# Patient Record
Sex: MALE | Race: WHITE | NOT HISPANIC OR LATINO | Employment: FULL TIME | URBAN - METROPOLITAN AREA
[De-identification: names, ages, dates, MRNs, and addresses within clinical notes are randomized per-mention and may not be internally consistent; named-entity substitution may affect disease eponyms.]

---

## 2018-04-25 ENCOUNTER — OFFICE VISIT (OUTPATIENT)
Dept: FAMILY MEDICINE CLINIC | Facility: CLINIC | Age: 33
End: 2018-04-25

## 2018-04-25 VITALS
HEIGHT: 72 IN | HEART RATE: 88 BPM | RESPIRATION RATE: 16 BRPM | TEMPERATURE: 97.4 F | SYSTOLIC BLOOD PRESSURE: 113 MMHG | WEIGHT: 188 LBS | DIASTOLIC BLOOD PRESSURE: 75 MMHG | BODY MASS INDEX: 25.47 KG/M2

## 2018-04-25 DIAGNOSIS — Z83.2 FAMILY HISTORY OF FACTOR V LEIDEN MUTATION: Primary | ICD-10-CM

## 2018-04-25 DIAGNOSIS — Z13.6 SCREENING FOR ISCHEMIC HEART DISEASE: ICD-10-CM

## 2018-04-25 PROCEDURE — 99395 PREV VISIT EST AGE 18-39: CPT | Performed by: FAMILY MEDICINE

## 2018-04-25 RX ORDER — ASPIRIN 325 MG
325 TABLET ORAL DAILY
COMMUNITY
End: 2018-07-12

## 2018-04-25 NOTE — PROGRESS NOTES
"Chief Complaint   Patient presents with   • Other     labs for family history of factor 5 deficiency       Subjective    He presents to the office to establish.  He is primarily here to get some lab work to rule out factor V Leiden deficiency.  His mother and sister have that condition.  He has never had a blood clot.  Recently he has been recovering from ankle fracture surgery sustained during working at his company.  He took several minutes and updated his chart.  He is due for some screening labs which have been ordered.  I have reviewed and updated his medications, medical history and problem list during today's office visit.     Patient Care Team:  Loi Saenz MD as PCP - General (Family Medicine)  Ramy Dawson DPM (Podiatry)    Social History   Substance Use Topics   • Smoking status: Never Smoker   • Smokeless tobacco: Never Used   • Alcohol use 1.2 oz/week     2 Standard drinks or equivalent per week       Review of Systems   Constitutional: Negative for fatigue.   Cardiovascular: Negative for chest pain.   Musculoskeletal: Positive for arthralgias.   Psychiatric/Behavioral: Decreased concentration: right leg aches some.   All other systems reviewed and are negative.      Objective     /75   Pulse 88   Temp 97.4 °F (36.3 °C) (Oral)   Resp 16   Ht 182.9 cm (72\")   Wt 85.3 kg (188 lb)   BMI 25.50 kg/m²     Body mass index is 25.5 kg/m².    Physical Exam   Constitutional: He is oriented to person, place, and time. He appears well-developed. No distress.   Eyes: Conjunctivae and lids are normal.   Neck: Carotid bruit is not present.   Cardiovascular: Normal rate, regular rhythm and normal heart sounds.    Pulmonary/Chest: Effort normal and breath sounds normal.   Musculoskeletal:   Walking boot on right lower leg.    Neurological: He is alert and oriented to person, place, and time.   Skin: Skin is warm and dry.   Psychiatric: He has a normal mood and affect. His behavior is normal.   Vitals " reviewed.       Data Reviewed:             Assessment/Plan     Problem List Items Addressed This Visit        Other    Family history of factor V Leiden mutation - Primary    Relevant Orders    Factor 5 Leiden    If screen is negative, stop aspirin      Other Visit Diagnoses     Screening for ischemic heart disease        Relevant Orders    Comprehensive metabolic panel    Lipid Panel With LDL/HDL Ratio    CBC and Differential          Orders Placed This Encounter   Procedures   • Factor 5 Leiden     Standing Status:   Future     Number of Occurrences:   1     Standing Expiration Date:   4/25/2019   • Comprehensive metabolic panel   • Lipid Panel With LDL/HDL Ratio   • CBC and Differential     Order Specific Question:   Manual Differential     Answer:   No         Current Outpatient Prescriptions:   •  aspirin 325 MG tablet, Take 325 mg by mouth Daily., Disp: , Rfl:     Return if symptoms worsen or fail to improve.

## 2018-05-01 ENCOUNTER — OFFICE VISIT (OUTPATIENT)
Dept: FAMILY MEDICINE CLINIC | Facility: CLINIC | Age: 33
End: 2018-05-01

## 2018-05-01 VITALS
HEIGHT: 72 IN | RESPIRATION RATE: 18 BRPM | HEART RATE: 91 BPM | OXYGEN SATURATION: 98 % | BODY MASS INDEX: 25.33 KG/M2 | DIASTOLIC BLOOD PRESSURE: 71 MMHG | TEMPERATURE: 97.9 F | WEIGHT: 187 LBS | SYSTOLIC BLOOD PRESSURE: 122 MMHG

## 2018-05-01 DIAGNOSIS — M54.50 CHRONIC RIGHT-SIDED LOW BACK PAIN WITHOUT SCIATICA: Primary | ICD-10-CM

## 2018-05-01 DIAGNOSIS — G89.29 CHRONIC RIGHT-SIDED LOW BACK PAIN WITHOUT SCIATICA: Primary | ICD-10-CM

## 2018-05-01 PROCEDURE — 99213 OFFICE O/P EST LOW 20 MIN: CPT | Performed by: NURSE PRACTITIONER

## 2018-05-01 NOTE — PROGRESS NOTES
Subjective   Isabel Parrish is a 32 y.o. male.     History of Present Illness   Isabel Parrish 32 y.o. male presents for evaluation and treatment of  low back pain. The patient first noted symptoms 2 months ago. It was not related to recent heavy lifting.  The pain intensity is moderate , and is located right side , lumbar and sacral. The pain is described as sharp and occurs constantly. The symptoms are not progressive. Symptoms are exacerbated by standing up and twisting. Factors which relieve the pain include nothing helps. Other associated symptoms include radiates to buttock and accross low back. Previous history of symptoms: never.    Patient states back pain seems worse after he eats but better after he drinks water.  He denies pain that radiates to the groin.  Denies mid or upper back pain . Denies abdominal pain or changes to bowel or bladder function.  Has FH of cholecystitis and renal calculi but no personal hx. Pain is worse when sitting and slightly better whey lying down.   The following portions of the patient's history were reviewed and updated as appropriate: allergies, current medications, past family history, past medical history, past social history, past surgical history and problem list.    Review of Systems   Constitutional: Negative for chills and fever.   Cardiovascular: Negative for chest pain.   Gastrointestinal: Negative for abdominal distention, abdominal pain, anal bleeding, blood in stool, constipation, diarrhea, nausea, rectal pain and vomiting.   Genitourinary: Negative for decreased urine volume, difficulty urinating, discharge, dysuria, enuresis, flank pain, frequency, genital sores, hematuria, penile pain, penile swelling, scrotal swelling, testicular pain and urgency.   Musculoskeletal: Positive for back pain. Negative for gait problem.   Neurological: Negative for weakness, numbness and headaches.       Objective   Physical Exam   Constitutional: He is oriented to person, place, and  time. He appears well-developed and well-nourished.   Pulmonary/Chest: Effort normal.   Musculoskeletal:        Lumbar back: He exhibits tenderness and pain. He exhibits normal range of motion, no bony tenderness, no swelling, no edema, no deformity, no laceration, no spasm and normal pulse.   Reports pulling to right lower back with right straight leg raise greater than 45 degrees.   Tenderness to palpation of right lumbar back.    Neurological: He is oriented to person, place, and time.   Reflex Scores:       Patellar reflexes are 2+ on the right side and 2+ on the left side.  Skin: Skin is warm and dry.   Psychiatric: He has a normal mood and affect. His behavior is normal. Judgment and thought content normal.   Nursing note and vitals reviewed.      Assessment/Plan   Isabel was seen today for back pain.    Diagnoses and all orders for this visit:    Chronic right-sided low back pain without sciatica  -     diclofenac (VOLTAREN) 1 % gel gel; Apply 4 g topically 4 (Four) Times a Day As Needed (low back pain).             Discussed with patient that location of pain and absence of other symptoms did not support cholecystitis or renal calculi.  Will treat with topical anti-inflammatory as patient is on  mg daily.   Will order imaging if no improvement in back pain or worsening of symptoms.

## 2018-05-02 DIAGNOSIS — R14.0 BLOATING SYMPTOM: Primary | ICD-10-CM

## 2018-05-02 DIAGNOSIS — R52 PAIN: ICD-10-CM

## 2018-05-02 LAB
ALBUMIN SERPL-MCNC: 4.8 G/DL (ref 3.5–5.2)
ALBUMIN/GLOB SERPL: 1.8 G/DL
ALP SERPL-CCNC: 100 U/L (ref 39–117)
ALT SERPL-CCNC: 47 U/L (ref 1–41)
AST SERPL-CCNC: 20 U/L (ref 1–40)
BASOPHILS # BLD AUTO: 0.03 10*3/MM3 (ref 0–0.2)
BASOPHILS NFR BLD AUTO: 0.5 % (ref 0–1.5)
BILIRUB SERPL-MCNC: 0.4 MG/DL (ref 0.1–1.2)
BUN SERPL-MCNC: 12 MG/DL (ref 6–20)
BUN/CREAT SERPL: 11.9 (ref 7–25)
CALCIUM SERPL-MCNC: 10.2 MG/DL (ref 8.6–10.5)
CHLORIDE SERPL-SCNC: 103 MMOL/L (ref 98–107)
CHOLEST SERPL-MCNC: 154 MG/DL (ref 0–200)
CO2 SERPL-SCNC: 28 MMOL/L (ref 22–29)
CREAT SERPL-MCNC: 1.01 MG/DL (ref 0.76–1.27)
EOSINOPHIL # BLD AUTO: 0.17 10*3/MM3 (ref 0–0.7)
EOSINOPHIL NFR BLD AUTO: 3 % (ref 0.3–6.2)
ERYTHROCYTE [DISTWIDTH] IN BLOOD BY AUTOMATED COUNT: 13.5 % (ref 11.5–14.5)
F5 GENE MUT ANL BLD/T: ABNORMAL
GFR SERPLBLD CREATININE-BSD FMLA CKD-EPI: 102 ML/MIN/1.73
GFR SERPLBLD CREATININE-BSD FMLA CKD-EPI: 85 ML/MIN/1.73
GLOBULIN SER CALC-MCNC: 2.6 GM/DL
GLUCOSE SERPL-MCNC: 88 MG/DL (ref 65–99)
HCT VFR BLD AUTO: 45.6 % (ref 40.4–52.2)
HDLC SERPL-MCNC: 41 MG/DL (ref 40–60)
HGB BLD-MCNC: 15.3 G/DL (ref 13.7–17.6)
IMM GRANULOCYTES # BLD: 0.03 10*3/MM3 (ref 0–0.03)
IMM GRANULOCYTES NFR BLD: 0.5 % (ref 0–0.5)
LDLC SERPL CALC-MCNC: 91 MG/DL (ref 0–100)
LDLC/HDLC SERPL: 2.21 {RATIO}
LYMPHOCYTES # BLD AUTO: 1.97 10*3/MM3 (ref 0.9–4.8)
LYMPHOCYTES NFR BLD AUTO: 34.4 % (ref 19.6–45.3)
MCH RBC QN AUTO: 29.3 PG (ref 27–32.7)
MCHC RBC AUTO-ENTMCNC: 33.6 G/DL (ref 32.6–36.4)
MCV RBC AUTO: 87.4 FL (ref 79.8–96.2)
MONOCYTES # BLD AUTO: 0.34 10*3/MM3 (ref 0.2–1.2)
MONOCYTES NFR BLD AUTO: 5.9 % (ref 5–12)
NEUTROPHILS # BLD AUTO: 3.21 10*3/MM3 (ref 1.9–8.1)
NEUTROPHILS NFR BLD AUTO: 56.2 % (ref 42.7–76)
PLATELET # BLD AUTO: 246 10*3/MM3 (ref 140–500)
POTASSIUM SERPL-SCNC: 4.9 MMOL/L (ref 3.5–5.2)
PROT SERPL-MCNC: 7.4 G/DL (ref 6–8.5)
RBC # BLD AUTO: 5.22 10*6/MM3 (ref 4.6–6)
SODIUM SERPL-SCNC: 140 MMOL/L (ref 136–145)
TRIGL SERPL-MCNC: 111 MG/DL (ref 0–150)
VLDLC SERPL CALC-MCNC: 22.2 MG/DL (ref 5–40)
WBC # BLD AUTO: 5.72 10*3/MM3 (ref 4.5–10.7)

## 2018-05-03 ENCOUNTER — PATIENT MESSAGE (OUTPATIENT)
Dept: FAMILY MEDICINE CLINIC | Facility: CLINIC | Age: 33
End: 2018-05-03

## 2018-05-03 PROBLEM — D68.2 FACTOR V DEFICIENCY (HCC): Status: ACTIVE | Noted: 2018-04-25

## 2018-05-03 PROBLEM — D68.2 FACTOR V DEFICIENCY (HCC): Status: ACTIVE | Noted: 2018-04-27

## 2018-05-04 NOTE — TELEPHONE ENCOUNTER
Allyssa Isabel MA 5/3/2018 4:27 PM EDT        ----- Message -----  From: Isabel Parrish  Sent: 5/3/2018 3:28 PM  To: [facility] Clinical Pool  Subject: Test Results Question     I have a question about COMPREHENSIVE METABOLIC PANEL resulted on 5/2/18 at 2:12 PM.  I hadn't been taking any other medications for a few weeks prior to this test with exception of the lovenox injections. I did have 1 beer at 2 different instances within that time. Just wondered if galbladder issues could factor into that result?

## 2018-05-07 ENCOUNTER — HOSPITAL ENCOUNTER (OUTPATIENT)
Dept: ULTRASOUND IMAGING | Facility: HOSPITAL | Age: 33
Discharge: HOME | End: 2018-05-07
Admitting: NURSE PRACTITIONER

## 2018-05-07 ENCOUNTER — APPOINTMENT (OUTPATIENT)
Dept: ULTRASOUND IMAGING | Facility: HOSPITAL | Age: 33
End: 2018-05-07

## 2018-05-07 PROCEDURE — 76705 ECHO EXAM OF ABDOMEN: CPT

## 2018-06-07 ENCOUNTER — OFFICE VISIT (OUTPATIENT)
Dept: RETAIL CLINIC | Facility: CLINIC | Age: 33
End: 2018-06-07

## 2018-06-07 VITALS
OXYGEN SATURATION: 98 % | HEART RATE: 72 BPM | DIASTOLIC BLOOD PRESSURE: 82 MMHG | RESPIRATION RATE: 18 BRPM | SYSTOLIC BLOOD PRESSURE: 128 MMHG

## 2018-06-07 DIAGNOSIS — H61.23 BILATERAL IMPACTED CERUMEN: Primary | ICD-10-CM

## 2018-06-07 PROCEDURE — 99212 OFFICE O/P EST SF 10 MIN: CPT | Performed by: NURSE PRACTITIONER

## 2018-06-07 RX ORDER — FLUTICASONE PROPIONATE 50 MCG
2 SPRAY, SUSPENSION (ML) NASAL DAILY
COMMUNITY
End: 2019-04-15

## 2018-06-07 NOTE — PROGRESS NOTES
Subjective:     Isabel Parrish is a 32 y.o.     Patient presents with a cerumen impaction on the left. He has a history of cerumen impaction. It started bothering him a couple of days ago. He has not tried to clean them out himself because last time it just made it worse.          The following portions of the patient's history were reviewed and updated as appropriate: allergies, current medications, past family history, past medical history, past social history, past surgical history and problem list.      Review of Systems   HENT: Positive for ear pain (left one feels  plugged up).    Respiratory: Negative.    Cardiovascular: Negative.    Neurological: Negative for dizziness, light-headedness and headaches.         Objective:      Physical Exam   HENT:   Nose: Nose normal.   Bilateral cerumen impaction, irrigated with warm warm water, H202, curette utilized, TM intact's intact bilaterally post impaction   Cardiovascular: Normal rate, regular rhythm, S1 normal, S2 normal and normal heart sounds.    Pulmonary/Chest: Effort normal. Decreased breath sounds: mildly decreased.   Lymphadenopathy:     He has no cervical adenopathy.   Vitals reviewed.          Diagnoses and all orders for this visit:    Bilateral impacted cerumen

## 2018-06-07 NOTE — PATIENT INSTRUCTIONS
Earwax Buildup  Your ears make a substance called earwax. It may also be called cerumen. Sometimes, too much earwax builds up in your ear canal. This can cause ear pain and make it harder for you to hear.  CAUSES  This condition is caused by too much earwax production or buildup.  RISK FACTORS  The following factors may make you more likely to develop this condition:  · Cleaning your ears often with swabs.  · Having narrow ear canals.  · Having earwax that is overly thick or sticky.  · Having eczema.  · Being dehydrated.  SYMPTOMS  Symptoms of this condition include:  · Reduced hearing.  · Ear drainage.  · Ear pain.  · Ear itch.  · A feeling of fullness in the ear or feeling that the ear is plugged.  · Ringing in the ear.  · Coughing.  DIAGNOSIS  Your health care provider can diagnose this condition based on your symptoms and medical history. Your health care provider will also do an ear exam to look inside your ear with a scope (otoscope). You may also have a hearing test.  TREATMENT  Treatment for this condition includes:  · Over-the-counter or prescription ear drops to soften the earwax.  · Earwax removal by a health care provider. This may be done:  ¨ By flushing the ear with body-temperature water.  ¨ With a medical instrument that has a loop at the end (earwax curette).  ¨ With a suction device.  HOME CARE INSTRUCTIONS  · Take over-the-counter and prescription medicines only as told by your health care provider.  · Do not put any objects, including an ear swab, into your ear. You can clean the opening of your ear canal with a washcloth.  · Drink enough water to keep your urine clear or pale yellow.  · If you have frequent earwax buildup or you use hearing aids, consider seeing your health care provider every 6-12 months for routine preventive ear cleanings. Keep all follow-up visits as told by your health care provider.  SEEK MEDICAL CARE IF:  · You have ear pain.  · Your condition does not improve with  treatment.  · You have hearing loss.  · You have blood, pus, or other fluid coming from your ear.  This information is not intended to replace advice given to you by your health care provider. Make sure you discuss any questions you have with your health care provider.  Document Released: 01/25/2006 Document Revised: 04/10/2017 Document Reviewed: 08/03/2016  [employer] Interactive Patient Education © 2017 [employer]

## 2018-07-12 ENCOUNTER — OFFICE VISIT (OUTPATIENT)
Dept: FAMILY MEDICINE CLINIC | Facility: CLINIC | Age: 33
End: 2018-07-12

## 2018-07-12 VITALS
TEMPERATURE: 97.2 F | RESPIRATION RATE: 16 BRPM | DIASTOLIC BLOOD PRESSURE: 83 MMHG | BODY MASS INDEX: 25.06 KG/M2 | SYSTOLIC BLOOD PRESSURE: 130 MMHG | HEIGHT: 72 IN | HEART RATE: 89 BPM | WEIGHT: 185 LBS

## 2018-07-12 DIAGNOSIS — R10.31 RIGHT LOWER QUADRANT ABDOMINAL PAIN: Primary | ICD-10-CM

## 2018-07-12 DIAGNOSIS — R19.5 POSITIVE FECAL OCCULT BLOOD TEST: ICD-10-CM

## 2018-07-12 DIAGNOSIS — K21.9 GASTROESOPHAGEAL REFLUX DISEASE WITHOUT ESOPHAGITIS: ICD-10-CM

## 2018-07-12 LAB
ALBUMIN SERPL-MCNC: 4.9 G/DL (ref 3.5–5.2)
ALP SERPL-CCNC: 121 U/L (ref 39–117)
ALT SERPL-CCNC: 23 U/L (ref 1–41)
AST SERPL-CCNC: 15 U/L (ref 1–40)
BASOPHILS # BLD AUTO: 0.04 10*3/MM3 (ref 0–0.2)
BASOPHILS NFR BLD AUTO: 0.7 % (ref 0–1.5)
BILIRUB DIRECT SERPL-MCNC: <0.2 MG/DL (ref 0–0.3)
BILIRUB SERPL-MCNC: 0.3 MG/DL (ref 0.1–1.2)
BUN SERPL-MCNC: 17 MG/DL (ref 6–20)
CREAT SERPL-MCNC: 0.95 MG/DL (ref 0.76–1.27)
DEVELOPER EXPIRATION DATE: ABNORMAL
DEVELOPER LOT NUMBER: ABNORMAL
EOSINOPHIL # BLD AUTO: 0.15 10*3/MM3 (ref 0–0.7)
EOSINOPHIL NFR BLD AUTO: 2.6 % (ref 0.3–6.2)
ERYTHROCYTE [DISTWIDTH] IN BLOOD BY AUTOMATED COUNT: 13.2 % (ref 11.5–14.5)
EXPIRATION DATE: ABNORMAL
FECAL OCCULT BLOOD SCREEN, POC: POSITIVE
HCT VFR BLD AUTO: 47.2 % (ref 40.4–52.2)
HGB BLD-MCNC: 15.5 G/DL (ref 13.7–17.6)
IMM GRANULOCYTES # BLD: 0 10*3/MM3 (ref 0–0.03)
IMM GRANULOCYTES NFR BLD: 0 % (ref 0–0.5)
LYMPHOCYTES # BLD AUTO: 1.66 10*3/MM3 (ref 0.9–4.8)
LYMPHOCYTES NFR BLD AUTO: 29.1 % (ref 19.6–45.3)
Lab: ABNORMAL
MCH RBC QN AUTO: 28 PG (ref 27–32.7)
MCHC RBC AUTO-ENTMCNC: 32.8 G/DL (ref 32.6–36.4)
MCV RBC AUTO: 85.4 FL (ref 79.8–96.2)
MONOCYTES # BLD AUTO: 0.49 10*3/MM3 (ref 0.2–1.2)
MONOCYTES NFR BLD AUTO: 8.6 % (ref 5–12)
NEGATIVE CONTROL: NEGATIVE
NEUTROPHILS # BLD AUTO: 3.37 10*3/MM3 (ref 1.9–8.1)
NEUTROPHILS NFR BLD AUTO: 59 % (ref 42.7–76)
PLATELET # BLD AUTO: 231 10*3/MM3 (ref 140–500)
POSITIVE CONTROL: POSITIVE
PROT SERPL-MCNC: 7.6 G/DL (ref 6–8.5)
RBC # BLD AUTO: 5.53 10*6/MM3 (ref 4.6–6)
WBC # BLD AUTO: 5.71 10*3/MM3 (ref 4.5–10.7)

## 2018-07-12 PROCEDURE — 82274 ASSAY TEST FOR BLOOD FECAL: CPT | Performed by: FAMILY MEDICINE

## 2018-07-12 PROCEDURE — 99215 OFFICE O/P EST HI 40 MIN: CPT | Performed by: FAMILY MEDICINE

## 2018-07-12 RX ORDER — OMEPRAZOLE 20 MG/1
20 CAPSULE, DELAYED RELEASE ORAL DAILY
COMMUNITY
End: 2019-04-15

## 2018-07-12 NOTE — PROGRESS NOTES
"Chief Complaint   Patient presents with   • Abdominal Pain   • Rectal Bleeding       Subjective   This patient presents the office with a several week history of mild to moderate right lower abdomen pain.  He has had some relief by taking over-the-counter omeprazole for 14 days and then his symptoms returned after stopping that medicine and he is now back on the medicine.  Tuesday he developed some bright red blood after a bowel movement.  The blood was not mixed in with the stool.  There was no pain associated with the rectal bleeding.  He has had normal bowel movements since then.  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  The patient was at physical therapy on Monday prior to the rectal bleeding and had taken ibuprofen on Monday.  Pain in the abdomen preceded him taking nonsteroidal anti-inflammatories.  There is family history of GERD.  There is no family history of colon cancer or colon polyps.  See patient supplied history below  I have reviewed and updated his medications, medical history and problem list during today's office visit.     Patient Care Team:  Loi Saenz MD as PCP - General (Family Medicine)  Ramy Dawson DPM (Podiatry)    Social History   Substance Use Topics   • Smoking status: Never Smoker   • Smokeless tobacco: Never Used   • Alcohol use 1.2 oz/week     2 Standard drinks or equivalent per week       Review of Systems   Constitutional: Negative for fever and unexpected weight loss.   Gastrointestinal: Positive for abdominal pain and nausea. Negative for constipation, diarrhea and vomiting.   Genitourinary: Negative for dysuria, frequency and hematuria.   Musculoskeletal: Negative for arthralgias and myalgias.   All other systems reviewed and are negative.      Objective     /83   Pulse 89   Temp 97.2 °F (36.2 °C) (Oral)   Resp 16   Ht 182.9 cm (72\")   Wt 83.9 kg (185 lb)   BMI 25.09 kg/m²     Body mass index is 25.09 kg/m².    Physical Exam   Constitutional: He appears " well-developed. No distress.   Abdominal: Soft. Normal appearance and bowel sounds are normal. There is no hepatosplenomegaly. There is tenderness in the right lower quadrant. There is rebound (refers pain to RLQ). There is no rigidity and no guarding.            Data Reviewed:     ifobt trace positive        Assessment/Plan     Problem List Items Addressed This Visit     Gastroesophageal reflux disease without esophagitis     Continue omeprazole         Relevant Medications    omeprazole (priLOSEC) 20 MG capsule    Positive fecal occult blood test    Relevant Orders    CT Abdomen Pelvis With & Without Contrast    Ambulatory Referral to Gastroenterology    POC Occult Blood Stool (Completed)      Other Visit Diagnoses     Right lower quadrant abdominal pain    -  Primary    Relevant Orders    CT Abdomen Pelvis With & Without Contrast    Ambulatory Referral to Gastroenterology    BUN    Creatinine, Serum    CBC & Differential    Hepatic Function Panel          Orders Placed This Encounter   Procedures   • CT Abdomen Pelvis With & Without Contrast     Standing Status:   Future     Standing Expiration Date:   1/8/2019     Order Specific Question:   Will Oral Contrast be needed for this procedure?     Answer:   Yes   • BUN   • Creatinine, Serum   • Hepatic Function Panel   • Ambulatory Referral to Gastroenterology     Referral Priority:   Routine     Referral Type:   Consultation     Referral Reason:   Specialty Services Required     Referred to Provider:   Thom Cordero MD     Requested Specialty:   Gastroenterology     Number of Visits Requested:   1   • POC Occult Blood Stool   • CBC & Differential     Order Specific Question:   Manual Differential     Answer:   No         Current Outpatient Prescriptions:   •  fluticasone (FLONASE) 50 MCG/ACT nasal spray, 2 sprays into each nostril Daily., Disp: , Rfl:   •  omeprazole (priLOSEC) 20 MG capsule, Take 20 mg by mouth Daily., Disp: , Rfl:     Return if symptoms  worsen or fail to improve.               Answers for HPI/ROS submitted by the patient on 7/11/2018   Abdominal pain  Chronicity: new  Onset: 1 to 4 weeks ago  Onset quality: gradual  Frequency: 2 to 4 times per day  Episode duration: 1 hours  Progression since onset: waxing and waning  Pain location: right flank  Pain - numeric: 2/10  Pain quality: sharp  Radiates to: left flank  anorexia: No  belching: Yes  flatus: Yes  headaches: No  hematochezia: Yes  melena: No  Aggravated by: deep breathing, drinking alcohol  Relieved by: recumbency  Diagnostic workup: ultrasound

## 2018-07-14 ENCOUNTER — APPOINTMENT (OUTPATIENT)
Dept: CT IMAGING | Facility: HOSPITAL | Age: 33
End: 2018-07-14

## 2018-07-14 ENCOUNTER — HOSPITAL ENCOUNTER (EMERGENCY)
Facility: HOSPITAL | Age: 33
Discharge: HOME | End: 2018-07-14
Attending: EMERGENCY MEDICINE | Admitting: EMERGENCY MEDICINE

## 2018-07-14 VITALS
WEIGHT: 185 LBS | BODY MASS INDEX: 25.06 KG/M2 | SYSTOLIC BLOOD PRESSURE: 114 MMHG | RESPIRATION RATE: 16 BRPM | OXYGEN SATURATION: 98 % | TEMPERATURE: 98.7 F | HEIGHT: 72 IN | HEART RATE: 78 BPM | DIASTOLIC BLOOD PRESSURE: 88 MMHG

## 2018-07-14 DIAGNOSIS — K60.0 ACUTE ANAL FISSURE: ICD-10-CM

## 2018-07-14 DIAGNOSIS — R10.823 RIGHT LOWER QUADRANT ABDOMINAL TENDERNESS WITH REBOUND TENDERNESS: Primary | ICD-10-CM

## 2018-07-14 LAB
ALBUMIN SERPL-MCNC: 4.7 G/DL (ref 3.5–5.2)
ALBUMIN/GLOB SERPL: 1.7 G/DL
ALP SERPL-CCNC: 114 U/L (ref 39–117)
ALT SERPL W P-5'-P-CCNC: 23 U/L (ref 1–41)
ANION GAP SERPL CALCULATED.3IONS-SCNC: 14.1 MMOL/L
AST SERPL-CCNC: 16 U/L (ref 1–40)
BASOPHILS # BLD AUTO: 0.03 10*3/MM3 (ref 0–0.2)
BASOPHILS NFR BLD AUTO: 0.5 % (ref 0–1.5)
BILIRUB SERPL-MCNC: 0.3 MG/DL (ref 0.1–1.2)
BILIRUB UR QL STRIP: NEGATIVE
BUN BLD-MCNC: 16 MG/DL (ref 6–20)
BUN/CREAT SERPL: 16 (ref 7–25)
CALCIUM SPEC-SCNC: 9.6 MG/DL (ref 8.6–10.5)
CHLORIDE SERPL-SCNC: 102 MMOL/L (ref 98–107)
CLARITY UR: CLEAR
CO2 SERPL-SCNC: 23.9 MMOL/L (ref 22–29)
COLOR UR: YELLOW
CREAT BLD-MCNC: 1 MG/DL (ref 0.76–1.27)
DEPRECATED RDW RBC AUTO: 38.1 FL (ref 37–54)
EOSINOPHIL # BLD AUTO: 0.07 10*3/MM3 (ref 0–0.7)
EOSINOPHIL NFR BLD AUTO: 1.2 % (ref 0.3–6.2)
ERYTHROCYTE [DISTWIDTH] IN BLOOD BY AUTOMATED COUNT: 12.7 % (ref 11.5–14.5)
GFR SERPL CREATININE-BSD FRML MDRD: 86 ML/MIN/1.73
GLOBULIN UR ELPH-MCNC: 2.8 GM/DL
GLUCOSE BLD-MCNC: 97 MG/DL (ref 65–99)
GLUCOSE UR STRIP-MCNC: NEGATIVE MG/DL
HCT VFR BLD AUTO: 44.1 % (ref 40.4–52.2)
HGB BLD-MCNC: 14.9 G/DL (ref 13.7–17.6)
HGB UR QL STRIP.AUTO: NEGATIVE
HOLD SPECIMEN: NORMAL
HOLD SPECIMEN: NORMAL
IMM GRANULOCYTES # BLD: 0.02 10*3/MM3 (ref 0–0.03)
IMM GRANULOCYTES NFR BLD: 0.3 % (ref 0–0.5)
KETONES UR QL STRIP: NEGATIVE
LEUKOCYTE ESTERASE UR QL STRIP.AUTO: NEGATIVE
LIPASE SERPL-CCNC: 26 U/L (ref 13–60)
LYMPHOCYTES # BLD AUTO: 1.77 10*3/MM3 (ref 0.9–4.8)
LYMPHOCYTES NFR BLD AUTO: 30.8 % (ref 19.6–45.3)
MCH RBC QN AUTO: 27.9 PG (ref 27–32.7)
MCHC RBC AUTO-ENTMCNC: 33.8 G/DL (ref 32.6–36.4)
MCV RBC AUTO: 82.6 FL (ref 79.8–96.2)
MONOCYTES # BLD AUTO: 0.37 10*3/MM3 (ref 0.2–1.2)
MONOCYTES NFR BLD AUTO: 6.4 % (ref 5–12)
NEUTROPHILS # BLD AUTO: 3.49 10*3/MM3 (ref 1.9–8.1)
NEUTROPHILS NFR BLD AUTO: 60.8 % (ref 42.7–76)
NITRITE UR QL STRIP: NEGATIVE
PH UR STRIP.AUTO: 5.5 [PH] (ref 5–8)
PLATELET # BLD AUTO: 215 10*3/MM3 (ref 140–500)
PMV BLD AUTO: 9.4 FL (ref 6–12)
POTASSIUM BLD-SCNC: 4.2 MMOL/L (ref 3.5–5.2)
PROT SERPL-MCNC: 7.5 G/DL (ref 6–8.5)
PROT UR QL STRIP: NEGATIVE
RBC # BLD AUTO: 5.34 10*6/MM3 (ref 4.6–6)
SODIUM BLD-SCNC: 140 MMOL/L (ref 136–145)
SP GR UR STRIP: 1.01 (ref 1–1.03)
UROBILINOGEN UR QL STRIP: NORMAL
WBC NRBC COR # BLD: 5.75 10*3/MM3 (ref 4.5–10.7)
WHOLE BLOOD HOLD SPECIMEN: NORMAL
WHOLE BLOOD HOLD SPECIMEN: NORMAL

## 2018-07-14 PROCEDURE — 85025 COMPLETE CBC W/AUTO DIFF WBC: CPT | Performed by: PHYSICIAN ASSISTANT

## 2018-07-14 PROCEDURE — 83690 ASSAY OF LIPASE: CPT | Performed by: PHYSICIAN ASSISTANT

## 2018-07-14 PROCEDURE — 74177 CT ABD & PELVIS W/CONTRAST: CPT

## 2018-07-14 PROCEDURE — 25010000002 IOPAMIDOL 61 % SOLUTION: Performed by: EMERGENCY MEDICINE

## 2018-07-14 PROCEDURE — 81003 URINALYSIS AUTO W/O SCOPE: CPT | Performed by: PHYSICIAN ASSISTANT

## 2018-07-14 PROCEDURE — 80053 COMPREHEN METABOLIC PANEL: CPT | Performed by: PHYSICIAN ASSISTANT

## 2018-07-14 PROCEDURE — 99283 EMERGENCY DEPT VISIT LOW MDM: CPT

## 2018-07-14 RX ORDER — DICYCLOMINE HCL 20 MG
20 TABLET ORAL EVERY 6 HOURS PRN
Qty: 20 TABLET | Refills: 0 | Status: SHIPPED | OUTPATIENT
Start: 2018-07-14 | End: 2019-03-04

## 2018-07-14 RX ADMIN — IOPAMIDOL 85 ML: 612 INJECTION, SOLUTION INTRAVENOUS at 18:05

## 2018-07-14 NOTE — ED PROVIDER NOTES
EMERGENCY DEPARTMENT ENCOUNTER    CHIEF COMPLAINT  Chief Complaint: rectal bleeding  History given by: Patient  History limited by: N/A  Room Number: 18/18  PMD: Loi Saenz MD      HPI:  Pt is a 32 y.o. male who presents complaining of 2 episodes of rectal bleeding with bright red blood over the past week..  Pt reports his last episode was this morning.  Pt reports he f/u with his PCP after the first episode of rectal bleeding who advised the pt to come to the ER if he has another episode.  Pt also c/o intermittent RLQ abd pain for the past 2 weeks, which is new for him.  Pt reports pain with palpation in his RLQ during his PCP visit.  Pt reports the episodes have been occurring more frequently over the past couple days.  Pt denies nausea, vomiting, appetite changes, or any other complaints at this time.    Duration:  1 week  Onset: gradual  Timing: intermittent  Location: GI  Radiation: none  Quality: bright red blood  Intensity/Severity: moderate  Progression: unchanged  Associated Symptoms:  intermittent RLQ abd pain for the past 2 weeks  Aggravating Factors: none stated  Alleviating Factors: none stated  Previous Episodes: none  Treatment before arrival: Pt reports he f/u with his PCP after the first episode of rectal bleeding who advised the pt to come to the ER if he has another episode.    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   • Gastroesophageal reflux disease without esophagitis 07/12/2018   • Positive fecal occult blood test 07/12/2018     Resolved Ambulatory Problems     Diagnosis Date Noted   • No Resolved Ambulatory Problems     Past Medical History:   Diagnosis Date   • Allergic    • Cerumen impaction        PAST SURGICAL HISTORY  Past Surgical History:   Procedure Laterality Date   • ANKLE SURGERY     • LASIK         FAMILY HISTORY  Family History   Problem Relation Age of Onset   • Hypertension Mother    • Factor V  Leiden deficiency Mother    • Nephrolithiasis Father    • Factor V Leiden deficiency Sister    • Nephrolithiasis Brother        SOCIAL HISTORY  Social History     Social History   • Marital status: Single     Spouse name: N/A   • Number of children: 0   • Years of education: N/A     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    REVIEW OF SYSTEMS  Review of Systems   Constitutional: Negative for activity change, appetite change and fever.   HENT: Negative for congestion and sore throat.    Eyes: Negative.    Respiratory: Negative for cough and shortness of breath.    Cardiovascular: Negative for chest pain and leg swelling.   Gastrointestinal: Positive for abdominal pain (intermittent RLQ x2 weeks) and anal bleeding (a couple episdoes over past week, episode this morning). Negative for diarrhea, nausea and vomiting.   Endocrine: Negative.    Genitourinary: Negative for decreased urine volume and dysuria.   Musculoskeletal: Negative for neck pain.   Skin: Negative for rash and wound.   Allergic/Immunologic: Negative.    Neurological: Negative for weakness, numbness and headaches.   Hematological: Negative.    Psychiatric/Behavioral: Negative.    All other systems reviewed and are negative.      PHYSICAL EXAM  ED Triage Vitals   Temp Heart Rate Resp BP SpO2   07/14/18 1650 07/14/18 1650 07/14/18 1650 07/14/18 1704 07/14/18 1650   98.7 °F (37.1 °C) 79 18 (!) 128/101 99 %      Temp src Heart Rate Source Patient Position BP Location FiO2 (%)   07/14/18 1650 -- -- -- --   Tympanic           Physical Exam   Constitutional: He is oriented to person, place, and  time. He appears distressed (mild).   HENT:   Head: Normocephalic and atraumatic.   Eyes: EOM are normal. Pupils are equal, round, and reactive to light.   Neck: Normal range of motion. Neck supple.   Cardiovascular: Normal rate, regular rhythm and normal heart sounds.    Pulmonary/Chest: Effort normal and breath sounds normal. No respiratory distress.   Abdominal: Soft. Bowel sounds are normal. There is tenderness in the right lower quadrant. There is no rebound and no guarding.   Genitourinary: Rectal exam shows fissure (suspect), tenderness (mild) and guaiac positive stool (heme positive).   Genitourinary Comments: Chaperone present.  Positive rectal tone.   Musculoskeletal: Normal range of motion. He exhibits no edema.   Neurological: He is alert and oriented to person, place, and time. He has normal sensation and normal strength.   Skin: Skin is warm and dry.   Psychiatric: Mood and affect normal.   Nursing note and vitals reviewed.      LAB RESULTS  Lab Results (last 24 hours)     Procedure Component Value Units Date/Time    CBC & Differential [[id number]] Collected:  07/14/18 1732    Specimen:  Blood Updated:  07/14/18 1742    Narrative:       The following orders were created for panel order CBC & Differential.  Procedure                               Abnormality         Status                     ---------                               -----------         ------                     CBC Auto Differential[[id number]]        Normal              Final result                 Please view results for these tests on the individual orders.    Comprehensive Metabolic Panel [[id number]] Collected:  07/14/18 1732    Specimen:  Blood Updated:  07/14/18 1800     Glucose 97 mg/dL      BUN 16 mg/dL      Creatinine 1.00 mg/dL      Sodium 140 mmol/L      Potassium 4.2 mmol/L      Chloride 102 mmol/L      CO2 23.9 mmol/L      Calcium 9.6 mg/dL      Total Protein 7.5 g/dL      Albumin 4.70 g/dL      ALT (SGPT) 23 U/L      AST  (SGOT) 16 U/L      Alkaline Phosphatase 114 U/L      Total Bilirubin 0.3 mg/dL      eGFR Non African Amer 86 mL/min/1.73      Globulin 2.8 gm/dL      A/G Ratio 1.7 g/dL      BUN/Creatinine Ratio 16.0     Anion Gap 14.1 mmol/L     Lipase [[id number]]  (Normal) Collected:  07/14/18 1732    Specimen:  Blood Updated:  07/14/18 1800     Lipase 26 U/L     CBC Auto Differential [[id number]]  (Normal) Collected:  07/14/18 1732    Specimen:  Blood Updated:  07/14/18 1742     WBC 5.75 10*3/mm3      RBC 5.34 10*6/mm3      Hemoglobin 14.9 g/dL      Hematocrit 44.1 %      MCV 82.6 fL      MCH 27.9 pg      MCHC 33.8 g/dL      RDW 12.7 %      RDW-SD 38.1 fl      MPV 9.4 fL      Platelets 215 10*3/mm3      Neutrophil % 60.8 %      Lymphocyte % 30.8 %      Monocyte % 6.4 %      Eosinophil % 1.2 %      Basophil % 0.5 %      Immature Grans % 0.3 %      Neutrophils, Absolute 3.49 10*3/mm3      Lymphocytes, Absolute 1.77 10*3/mm3      Monocytes, Absolute 0.37 10*3/mm3      Eosinophils, Absolute 0.07 10*3/mm3      Basophils, Absolute 0.03 10*3/mm3      Immature Grans, Absolute 0.02 10*3/mm3     Urinalysis With Microscopic If Indicated (No Culture) - Urine, Clean Catch [[id number]]  (Normal) Collected:  07/14/18 1748    Specimen:  Urine from Urine, Clean Catch Updated:  07/14/18 1802     Color, UA Yellow     Appearance, UA Clear     pH, UA 5.5     Specific Gravity, UA 1.009     Glucose, UA Negative     Ketones, UA Negative     Bilirubin, UA Negative     Blood, UA Negative     Protein, UA Negative     Leuk Esterase, UA Negative     Nitrite, UA Negative     Urobilinogen, UA 0.2 E.U./dL    Narrative:       Urine microscopic not indicated.          I ordered the above labs and reviewed the results    RADIOLOGY  CT Abdomen Pelvis With Contrast   Preliminary Result   1.  No evidence of appendicitis.   2.  Punctate nonobstructing right renal stone.       Radiation dose reduction techniques were utilized, including automated   exposure control and  exposure modulation based on body size.                   I ordered the above noted radiological studies. Interpreted by radiologist. Reviewed by me in PACS.       PROCEDURES  Procedures      PROGRESS AND CONSULTS  ED Course as of Jul 14 1902   Sat Jul 14, 2018   1730 Reports 2 episodes of bright red blood per rectum with normal-appearing stool over the last 1 week.  He's also had several episodes of intermittent right mid-to lower abdominal pain.  Happens a few times a day, lasts for a few seconds no fever nausea or vomiting.  After first episode saw PCP and was told to come to ER if it happened again. Exam-no distress, lungs clear, regular rate and rhythm, abdomen benign.  [KA]      ED Course User Index  [KA] MARK Bowden     1747  Initial encounter. Discussed with pt CBC results showing stable WBC and Hgb.  Discussed plan for Ct Abd/Pelvis for further evaluation. Pt understands and agrees with the plan, all questions answered.    1752  Ordered CT Abd/Pelvis with contrast for further evaluation.    1856  Rechecked pt who is resting comfortably. Discussed with pt his CT is unremarkable.  I suspect pt has a tiny anal fissure. Plan for discharge with Bentyl for cramping and to use stool softeners and sitz baths.  Advised reasons to RTER.  Pt understands and agrees with the plan, all questions answered.        MEDICAL DECISION MAKING  Results were reviewed/discussed with the patient and they were also made aware of online access. Pt also made aware that some labs, such as cultures, will not be resulted during ER visit and follow up with PMD is necessary.     MDM  Number of Diagnoses or Management Options     Amount and/or Complexity of Data Reviewed  Clinical lab tests: reviewed and ordered (Labs unremarkable. CBC: Hgb 14.9.  )  Tests in the radiology section of CPT®: ordered and reviewed (CT Abd/Pelvis shows NAD.)  Discussion of test results with the performing providers: yes (Dr. Bravo  (radiology))  Independent visualization of images, tracings, or specimens: yes    Patient Progress  Patient progress: stable         DIAGNOSIS  Final diagnoses:   Right lower quadrant abdominal tenderness with rebound tenderness   Acute anal fissure       DISPOSITION  DISCHARGE    Patient discharged in stable condition.    Reviewed implications of results, diagnosis, meds, responsibility to follow up, warning signs and symptoms of possible worsening, potential complications and reasons to return to ER.    Patient/Family voiced understanding of above instructions.    Discussed plan for discharge, as there is no emergent indication for admission. Patient referred to primary care provider for BP management due to today's BP. Pt/family is agreeable and understands need for follow up and repeat testing.  Pt is aware that discharge does not mean that nothing is wrong but it indicates no emergency is present that requires admission and they must continue care with follow-up as given below or physician of their choice.     FOLLOW-UP  Loi Saenz MD  [id number] [address]  [phone]      As needed         Medication List      New Prescriptions    dicyclomine 20 MG tablet  Commonly known as:  BENTYL  Take 1 tablet by mouth Every 6 (Six) Hours As Needed (cramping).              Latest Documented Vital Signs:  As of 7:02 PM  BP- 150/82 HR- 81 Temp- 98.7 °F (37.1 °C) (Tympanic) O2 sat- 99%    --  Documentation assistance provided by allie Vanessa for Dr. Arenas.  Information recorded by the scribe was done at my direction and has been verified and validated by me.       Ro Vanessa  07/14/18 1902       Pietro Arenas MD  07/14/18 0626

## 2018-07-18 ENCOUNTER — TELEPHONE (OUTPATIENT)
Dept: SOCIAL WORK | Facility: HOSPITAL | Age: 33
End: 2018-07-18

## 2018-08-09 ENCOUNTER — OFFICE VISIT (OUTPATIENT)
Dept: GASTROENTEROLOGY | Facility: CLINIC | Age: 33
End: 2018-08-09

## 2018-08-09 VITALS
WEIGHT: 188.4 LBS | DIASTOLIC BLOOD PRESSURE: 84 MMHG | HEIGHT: 72 IN | TEMPERATURE: 98 F | SYSTOLIC BLOOD PRESSURE: 118 MMHG | BODY MASS INDEX: 25.52 KG/M2

## 2018-08-09 DIAGNOSIS — K60.2 ANAL FISSURE: ICD-10-CM

## 2018-08-09 DIAGNOSIS — K21.9 GASTROESOPHAGEAL REFLUX DISEASE WITHOUT ESOPHAGITIS: Primary | ICD-10-CM

## 2018-08-09 PROCEDURE — 99203 OFFICE O/P NEW LOW 30 MIN: CPT | Performed by: INTERNAL MEDICINE

## 2018-08-09 NOTE — PROGRESS NOTES
Answers for HPI/ROS submitted by the patient on 8/2/2018   Abdominal pain  Chronicity: new  Onset: 1 to 4 weeks ago  Onset quality: sudden  Frequency: every several days  Episode duration: 1 hours  Progression since onset: gradually improving  Pain location: suprapubic region, right flank  Pain - numeric: 1/10  Pain quality: sharp  Radiates to: left flank  anorexia: No  arthralgias: No  belching: Yes  constipation: No  diarrhea: No  dysuria: No  fever: No  flatus: No  frequency: No  headaches: No  hematochezia: Yes  hematuria: No  melena: No  myalgias: No  nausea: No  weight loss: No  vomiting: No  Aggravated by: drinking alcohol, NSAIDs  Relieved by: liquids  Diagnostic workup: CT scan, ultrasound  Chief Complaint   Patient presents with   • Abdominal Pain   • Rectal Bleeding     History of anal fissure     Isabel Parrish is a 32 y.o. male who presents with a history of abdominal pain and anal fissure   HPI     Patient 32-year-old male with history of factor V Leiden deficiency who presented to the emergency room and his family physician with right lower quadrant pain and rectal bleeding.  Evaluation in the emergency room included CAT scan lab tests all of which were normal.  I did review the images and we'll comment below.  Patient's labs were all within normal limits and since the ER visit approximately 4 weeks ago all of his symptoms have resolved and patient has no complaints.  Patient denies previous history of rectal bleeding.  Patient with no change in weight or diet.  Patient denies nausea vomiting or abdominal pain.  Patient denies any fever or chills no sick contacts.  Patient denies family history of inflammatory bowel disease.  Patient here for further recommendations.    Past Medical History:   Diagnosis Date   • Allergic    • Cerumen impaction    • Clotting disorder (CMS/HCC) 4/24/18    Not a bleeding problem per say, Dioagnosed Factor 5 Leiden       Current Outpatient Prescriptions:   •  fluticasone  (FLONASE) 50 MCG/ACT nasal spray, 2 sprays into each nostril Daily., Disp: , Rfl:   •  dicyclomine (BENTYL) 20 MG tablet, Take 1 tablet by mouth Every 6 (Six) Hours As Needed (cramping)., Disp: 20 tablet, Rfl: 0  •  omeprazole (priLOSEC) 20 MG capsule, Take 20 mg by mouth Daily., Disp: , Rfl:   No Known Allergies  Social History     Social History   • Marital status: Single     Spouse name: N/A   • Number of children: 0   • Years of education: N/A     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[employer]     Social History Main Topics   • Smoking status: Never Smoker   • Smokeless tobacco: Never Used   • Alcohol use 1.2 oz/week     2 Standard drinks or equivalent per week   • Drug use: No   • Sexual activity: No     Other Topics Concern   • Not on file     Social History Narrative   • No narrative on file     Family History   Problem Relation Age of Onset   • Hypertension Mother    • Factor V Leiden deficiency Mother    • Pancreatitis Mother    • Nephrolithiasis Father    • Colon polyps Father         Non malignant   • Factor V Leiden deficiency Sister    • Nephrolithiasis Brother    • Liver cancer Maternal Grandmother      Review of Systems   Constitutional: Negative.  Negative for fever.   HENT: Negative.    Eyes: Negative.    Respiratory: Negative.    Cardiovascular: Negative.    Gastrointestinal: Positive for abdominal pain and blood in stool. Negative for abdominal distention, constipation, diarrhea, nausea, rectal pain and vomiting.   Endocrine: Negative.    Genitourinary: Negative for dysuria, frequency and hematuria.   Musculoskeletal: Negative.  Negative for arthralgias and myalgias.   Skin: Negative.    Allergic/Immunologic: Positive for environmental allergies. Negative for immunocompromised state.   Neurological: Negative for headaches.   Hematological: Negative.      Vitals:    08/09/18 1106   BP: 118/84   Temp: 98 °F (36.7 °C)     Physical Exam   Constitutional: He is oriented to person, place,  and time. He appears well-developed and well-nourished.   HENT:   Head: Normocephalic and atraumatic.   Eyes: Pupils are equal, round, and reactive to light. EOM are normal. No scleral icterus.   Neck: Normal range of motion. No tracheal deviation present.   Cardiovascular: Normal rate and regular rhythm.  Exam reveals no gallop and no friction rub.    No murmur heard.  Pulmonary/Chest: Effort normal and breath sounds normal. No respiratory distress. He has no wheezes. He has no rales.   Abdominal: Soft. Bowel sounds are normal. He exhibits no distension and no mass. There is no tenderness. There is no rebound and no guarding.   Musculoskeletal: Normal range of motion. He exhibits no edema.   Lymphadenopathy:     He has no cervical adenopathy.   Neurological: He is alert and oriented to person, place, and time. No cranial nerve deficit.   Skin: Skin is warm and dry. No rash noted.   Psychiatric: He has a normal mood and affect. His behavior is normal.   Nursing note and vitals reviewed.    Diagnoses and all orders for this visit:    Gastroesophageal reflux disease without esophagitis    Anal fissure    Patient 32-year-old male with history of factor V Leiden deficiency status post recent right lower extremity injury who presented to the emergency room with right lower quadrant pain and rectal bleeding.  Patient was noted on CT to be unremarkable with normal labs and anal fissure.  Review of the CAT scan imaging by my self reveals solid stool in the right transverse and descending colon consistent with clinical constipation.  After his ER visit patient reports bleeding has stopped and pain has resolved.  History and workup consistent with medication and activity-related constipation causing anal fissure and bleeding along with pain.  Patient now active mobile and off pain medicines with no further constipation no further pain and no bleeding.  This point no indication for further imaging or evaluation unless symptoms  return.  Patient encouraged follow high-fiber diet and increase water intake and call if symptoms return for further endoscopic evaluation.  For now we'll follow-up clinically.  Patient does have a history of occasional heartburn took approximately 6 weeks of omeprazole and all of his symptoms have resolved as well.  No indication for endoscopic evaluation for his reflux.  Patient reports no dysphagia no weight loss.  We'll follow-up clinically for this as well.

## 2019-03-04 ENCOUNTER — OFFICE VISIT (OUTPATIENT)
Dept: FAMILY MEDICINE CLINIC | Facility: CLINIC | Age: 34
End: 2019-03-04

## 2019-03-04 VITALS
HEIGHT: 72 IN | BODY MASS INDEX: 25.47 KG/M2 | TEMPERATURE: 97.5 F | HEART RATE: 76 BPM | SYSTOLIC BLOOD PRESSURE: 128 MMHG | WEIGHT: 188 LBS | RESPIRATION RATE: 16 BRPM | DIASTOLIC BLOOD PRESSURE: 88 MMHG

## 2019-03-04 DIAGNOSIS — H57.9 EYE SYMPTOM: ICD-10-CM

## 2019-03-04 DIAGNOSIS — H53.9 VISUAL DISTURBANCE: Primary | ICD-10-CM

## 2019-03-04 PROCEDURE — 99213 OFFICE O/P EST LOW 20 MIN: CPT | Performed by: FAMILY MEDICINE

## 2019-03-04 NOTE — PROGRESS NOTES
"Chief Complaint   Patient presents with   • Eye Problem       Subjective   Patient presents the office with 2-week history of symptoms of his eyes.  He has noted some strange feeling and some redness in the eye.  The redness in the eye has actually improved but recently he did notice some problems of pink halos with light.  Pertinent history is LASIK surgery March 15, 2018.  He does not have any eye drainage.  There is not excessive itching from the eyes.  No allergic symptoms of sneezing currently.  I have reviewed and updated his medications, medical history and problem list during today's office visit.     Patient Care Team:  Loi Saenz MD as PCP - General (Family Medicine)  Ramy Dawson DPM (Podiatry)    Social History     Tobacco Use   • Smoking status: Never Smoker   • Smokeless tobacco: Never Used   Substance Use Topics   • Alcohol use: Yes     Alcohol/week: 1.2 oz     Types: 2 Standard drinks or equivalent per week       Review of Systems   Eyes: Positive for visual disturbance. Negative for itching.        See HPI       Objective     /88   Pulse 76   Temp 97.5 °F (36.4 °C) (Oral)   Resp 16   Ht 182.9 cm (72\")   Wt 85.3 kg (188 lb)   BMI 25.50 kg/m²     Body mass index is 25.5 kg/m².    Physical Exam   Constitutional: He appears well-developed.   Eyes: Conjunctivae, EOM and lids are normal. Pupils are equal, round, and reactive to light.   Fundoscopic exam:       The right eye shows no AV nicking.        The left eye shows no AV nicking.        Data Reviewed:                    Assessment/Plan     Problem List Items Addressed This Visit     None      Visit Diagnoses     Visual disturbance    -  Primary    Relevant Orders    Ambulatory Referral to Ophthalmology    Eye symptom        Relevant Orders    Ambulatory Referral to Ophthalmology          Orders Placed This Encounter   Procedures   • Ambulatory Referral to Ophthalmology     Referral Priority:   Urgent     Referral Type:   " Consultation     Referral Reason:   Specialty Services Required     Referred to Provider:   Michael Howell MD     Requested Specialty:   Ophthalmology     Number of Visits Requested:   1         Current Outpatient Medications:   •  fluticasone (FLONASE) 50 MCG/ACT nasal spray, 2 sprays into each nostril Daily., Disp: , Rfl:   •  omeprazole (priLOSEC) 20 MG capsule, Take 20 mg by mouth Daily., Disp: , Rfl:     No Follow-up on file.

## 2019-03-27 ENCOUNTER — OFFICE VISIT (OUTPATIENT)
Dept: FAMILY MEDICINE CLINIC | Facility: CLINIC | Age: 34
End: 2019-03-27

## 2019-03-27 VITALS
SYSTOLIC BLOOD PRESSURE: 128 MMHG | HEART RATE: 87 BPM | DIASTOLIC BLOOD PRESSURE: 78 MMHG | RESPIRATION RATE: 16 BRPM | BODY MASS INDEX: 25.33 KG/M2 | HEIGHT: 72 IN | TEMPERATURE: 97.5 F | OXYGEN SATURATION: 99 % | WEIGHT: 187 LBS

## 2019-03-27 DIAGNOSIS — R68.2 DRY MOUTH: ICD-10-CM

## 2019-03-27 DIAGNOSIS — H04.123 DRY EYE SYNDROME OF BOTH EYES: Primary | ICD-10-CM

## 2019-03-27 DIAGNOSIS — H16.229 KERATOCONJUNCT SICCA, NOT SPECIFIED AS SJOGREN'S, UNSP EYE: ICD-10-CM

## 2019-03-27 DIAGNOSIS — E55.9 VITAMIN D DEFICIENCY: ICD-10-CM

## 2019-03-27 PROCEDURE — 99213 OFFICE O/P EST LOW 20 MIN: CPT | Performed by: NURSE PRACTITIONER

## 2019-03-27 NOTE — PROGRESS NOTES
Subjective   Isabel Parrish is a 33 y.o. male.     History of Present Illness   Patient follows up in office for dry eyes and dry mouth.  States symptoms have worsened since his last visit a few weeks ago.  He has seen ophthalmologist who instructed him to use Refresh eye drops.  Patient has been using every few hours daily, but states eyes will become dry again quickly.  He has increased his water intake which has not helped his dry mouth. He stopped using flonase as it seemed to worsen symptoms.  He has woken up at night with his eyes burning and his mouth extremely dry.  Denies joint pain or swelling.  Has had symptoms for the past month.  States skin has also become dry.   The following portions of the patient's history were reviewed and updated as appropriate: allergies, current medications, past family history, past medical history, past social history, past surgical history and problem list.    Review of Systems   Constitutional: Negative for activity change, appetite change, chills, fatigue, fever and unexpected weight change.   HENT: Negative for congestion, ear pain, sinus pressure and sinus pain.    Eyes: Positive for photophobia and redness. Negative for pain, discharge, itching and visual disturbance.   Respiratory: Negative for cough.    Cardiovascular: Negative for chest pain, palpitations and leg swelling.   Endocrine: Negative for cold intolerance, heat intolerance, polydipsia, polyphagia and polyuria.   Musculoskeletal: Negative for arthralgias and joint swelling.   Skin:        Dry skin   Allergic/Immunologic: Negative for immunocompromised state.   Neurological: Negative for dizziness.   Hematological: Negative for adenopathy. Does not bruise/bleed easily.       Objective   Physical Exam   Constitutional: He is oriented to person, place, and time. He appears well-developed and well-nourished.   HENT:   Mouth/Throat: Uvula is midline and oropharynx is clear and moist. Mucous membranes are dry. No  tonsillar exudate.   Tongue thick and fissured    Eyes: Conjunctivae, EOM and lids are normal. Pupils are equal, round, and reactive to light.   Pulmonary/Chest: Effort normal.   Neurological: He is oriented to person, place, and time.   Skin: Skin is warm and dry.   Psychiatric: He has a normal mood and affect. His behavior is normal. Judgment and thought content normal.   Nursing note and vitals reviewed.      Assessment/Plan   Isabel was seen today for dry eye and dry mouth.    Diagnoses and all orders for this visit:    Dry eye syndrome of both eyes  -     Sjogren's Antibody, Anti-SS-A / -SS-B  -     KARTHIK  -     Rheumatoid Factor  -     Comprehensive metabolic panel  -     Lipid panel  -     CBC and Differential  -     TSH  -     Vitamin D 25 Hydroxy    Dry mouth  -     Sjogren's Antibody, Anti-SS-A / -SS-B  -     KARTHIK  -     Rheumatoid Factor  -     Comprehensive metabolic panel  -     Lipid panel  -     CBC and Differential  -     TSH  -     Vitamin D 25 Hydroxy    Vitamin D deficiency  -     Comprehensive metabolic panel  -     Lipid panel  -     CBC and Differential  -     TSH  -     Vitamin D 25 Hydroxy

## 2019-03-28 LAB
25(OH)D3+25(OH)D2 SERPL-MCNC: 54.5 NG/ML (ref 30–100)
ALBUMIN SERPL-MCNC: 4.8 G/DL (ref 3.5–5.2)
ALBUMIN/GLOB SERPL: 1.7 G/DL
ALP SERPL-CCNC: 97 U/L (ref 39–117)
ALT SERPL-CCNC: 36 U/L (ref 1–41)
ANA SER QL: NEGATIVE
AST SERPL-CCNC: 20 U/L (ref 1–40)
BASOPHILS # BLD AUTO: 0.05 10*3/MM3 (ref 0–0.2)
BASOPHILS NFR BLD AUTO: 1.1 % (ref 0–1.5)
BILIRUB SERPL-MCNC: 0.3 MG/DL (ref 0.2–1.2)
BUN SERPL-MCNC: 11 MG/DL (ref 6–20)
BUN/CREAT SERPL: 11.8 (ref 7–25)
CALCIUM SERPL-MCNC: 9.8 MG/DL (ref 8.6–10.5)
CHLORIDE SERPL-SCNC: 102 MMOL/L (ref 98–107)
CHOLEST SERPL-MCNC: 149 MG/DL (ref 0–200)
CO2 SERPL-SCNC: 26.4 MMOL/L (ref 22–29)
CREAT SERPL-MCNC: 0.93 MG/DL (ref 0.76–1.27)
ENA SS-A AB SER-ACNC: <0.2 AI (ref 0–0.9)
ENA SS-B AB SER-ACNC: <0.2 AI (ref 0–0.9)
EOSINOPHIL # BLD AUTO: 0.08 10*3/MM3 (ref 0–0.4)
EOSINOPHIL NFR BLD AUTO: 1.7 % (ref 0.3–6.2)
ERYTHROCYTE [DISTWIDTH] IN BLOOD BY AUTOMATED COUNT: 13.2 % (ref 12.3–15.4)
GLOBULIN SER CALC-MCNC: 2.8 GM/DL
GLUCOSE SERPL-MCNC: 98 MG/DL (ref 65–99)
HCT VFR BLD AUTO: 49.5 % (ref 37.5–51)
HDLC SERPL-MCNC: 41 MG/DL (ref 40–60)
HGB BLD-MCNC: 16 G/DL (ref 13–17.7)
IMM GRANULOCYTES # BLD AUTO: 0.04 10*3/MM3 (ref 0–0.05)
IMM GRANULOCYTES NFR BLD AUTO: 0.9 % (ref 0–0.5)
LDLC SERPL CALC-MCNC: 91 MG/DL (ref 0–100)
LYMPHOCYTES # BLD AUTO: 1.37 10*3/MM3 (ref 0.7–3.1)
LYMPHOCYTES NFR BLD AUTO: 29.7 % (ref 19.6–45.3)
MCH RBC QN AUTO: 27.7 PG (ref 26.6–33)
MCHC RBC AUTO-ENTMCNC: 32.3 G/DL (ref 31.5–35.7)
MCV RBC AUTO: 85.8 FL (ref 79–97)
MONOCYTES # BLD AUTO: 0.28 10*3/MM3 (ref 0.1–0.9)
MONOCYTES NFR BLD AUTO: 6.1 % (ref 5–12)
NEUTROPHILS # BLD AUTO: 2.8 10*3/MM3 (ref 1.4–7)
NEUTROPHILS NFR BLD AUTO: 60.5 % (ref 42.7–76)
NRBC BLD AUTO-RTO: 0 /100 WBC (ref 0–0)
PLATELET # BLD AUTO: 269 10*3/MM3 (ref 140–450)
POTASSIUM SERPL-SCNC: 4.5 MMOL/L (ref 3.5–5.2)
PROT SERPL-MCNC: 7.6 G/DL (ref 6–8.5)
RBC # BLD AUTO: 5.77 10*6/MM3 (ref 4.14–5.8)
RHEUMATOID FACT SERPL-ACNC: <10 IU/ML (ref 0–13.9)
SODIUM SERPL-SCNC: 140 MMOL/L (ref 136–145)
TRIGL SERPL-MCNC: 86 MG/DL (ref 0–150)
TSH SERPL DL<=0.005 MIU/L-ACNC: 1.77 MIU/ML (ref 0.27–4.2)
VLDLC SERPL CALC-MCNC: 17.2 MG/DL (ref 5–40)
WBC # BLD AUTO: 4.62 10*3/MM3 (ref 3.4–10.8)

## 2019-04-15 ENCOUNTER — OFFICE VISIT (OUTPATIENT)
Dept: FAMILY MEDICINE CLINIC | Facility: CLINIC | Age: 34
End: 2019-04-15

## 2019-04-15 VITALS
HEART RATE: 63 BPM | RESPIRATION RATE: 20 BRPM | BODY MASS INDEX: 25.47 KG/M2 | OXYGEN SATURATION: 94 % | HEIGHT: 72 IN | SYSTOLIC BLOOD PRESSURE: 120 MMHG | DIASTOLIC BLOOD PRESSURE: 80 MMHG | TEMPERATURE: 97.6 F | WEIGHT: 188 LBS

## 2019-04-15 DIAGNOSIS — F41.9 ANXIETY: Primary | ICD-10-CM

## 2019-04-15 PROCEDURE — 99213 OFFICE O/P EST LOW 20 MIN: CPT | Performed by: NURSE PRACTITIONER

## 2019-04-15 RX ORDER — ESCITALOPRAM OXALATE 10 MG/1
10 TABLET ORAL DAILY
Qty: 30 TABLET | Refills: 0 | Status: SHIPPED | OUTPATIENT
Start: 2019-04-15 | End: 2019-05-13 | Stop reason: SDUPTHER

## 2019-04-15 RX ORDER — MONTELUKAST SODIUM 10 MG/1
10 TABLET ORAL DAILY
Refills: 6 | COMMUNITY
Start: 2019-04-05

## 2019-04-15 NOTE — PROGRESS NOTES
Subjective   Isabel Parrish is a 33 y.o. male.     History of Present Illness   Isabel Parrish male 33 y.o. who presents today for new evaluation and treatment of Anxiety.  He reports anxiety, panic feeling and irritable. Onset of symptoms was approximately several weeks ago.  He denies current suicidal and homicidal ideation. Risk factors are job stress and recent illness.  Previous treatment includes none.  He complains of the following medication side effects: none.      The following portions of the patient's history were reviewed and updated as appropriate: allergies, current medications, past family history, past medical history, past social history, past surgical history and problem list.    Review of Systems   Constitutional: Negative for fatigue.   Respiratory: Negative for cough and shortness of breath.    Cardiovascular: Negative for chest pain and palpitations.   Skin: Negative for rash.   Psychiatric/Behavioral: Positive for agitation and decreased concentration. Negative for dysphoric mood and sleep disturbance. The patient is nervous/anxious.        Objective   Physical Exam   Constitutional: He is oriented to person, place, and time. He appears well-developed and well-nourished.   Pulmonary/Chest: Effort normal.   Neurological: He is oriented to person, place, and time.   Skin: Skin is warm and dry.   Psychiatric: He has a normal mood and affect. His behavior is normal. Judgment and thought content normal.   Nursing note and vitals reviewed.      Assessment/Plan   Isabel was seen today for anxiety and panic attack.    Diagnoses and all orders for this visit:    Anxiety  -     escitalopram (LEXAPRO) 10 MG tablet; Take 1 tablet by mouth Daily. One PO daily for stress

## 2019-05-06 ENCOUNTER — HOSPITAL ENCOUNTER (OUTPATIENT)
Dept: GENERAL RADIOLOGY | Facility: HOSPITAL | Age: 34
Discharge: HOME | End: 2019-05-06
Admitting: INTERNAL MEDICINE

## 2019-05-06 ENCOUNTER — TRANSCRIBE ORDERS (OUTPATIENT)
Dept: ADMINISTRATIVE | Facility: HOSPITAL | Age: 34
End: 2019-05-06

## 2019-05-06 DIAGNOSIS — R05.9 COUGH: Primary | ICD-10-CM

## 2019-05-06 PROCEDURE — 71046 X-RAY EXAM CHEST 2 VIEWS: CPT

## 2019-05-13 ENCOUNTER — OFFICE VISIT (OUTPATIENT)
Dept: FAMILY MEDICINE CLINIC | Facility: CLINIC | Age: 34
End: 2019-05-13

## 2019-05-13 VITALS
WEIGHT: 191 LBS | OXYGEN SATURATION: 99 % | HEART RATE: 73 BPM | SYSTOLIC BLOOD PRESSURE: 120 MMHG | HEIGHT: 72 IN | RESPIRATION RATE: 16 BRPM | DIASTOLIC BLOOD PRESSURE: 72 MMHG | BODY MASS INDEX: 25.87 KG/M2 | TEMPERATURE: 98.3 F

## 2019-05-13 DIAGNOSIS — F41.9 ANXIETY: ICD-10-CM

## 2019-05-13 PROCEDURE — 99213 OFFICE O/P EST LOW 20 MIN: CPT | Performed by: NURSE PRACTITIONER

## 2019-05-13 RX ORDER — LOTEPREDNOL ETABONATE 2 MG/ML
SUSPENSION/ DROPS OPHTHALMIC
Refills: 1 | COMMUNITY
Start: 2019-05-01 | End: 2022-10-19

## 2019-05-13 RX ORDER — ESCITALOPRAM OXALATE 10 MG/1
10 TABLET ORAL DAILY
Qty: 30 TABLET | Refills: 6 | Status: SHIPPED | OUTPATIENT
Start: 2019-05-13 | End: 2019-11-04 | Stop reason: SDUPTHER

## 2019-05-13 NOTE — PROGRESS NOTES
Subjective   Isabel Parrish is a 33 y.o. male.     History of Present Illness   Isabel Parrish male 33 y.o. who presents today for follow up of Anxiety.  He reports medication is working well, patient desires to continue on Rx, and needs refill.  He denies current suicidal and homicidal ideation. Risk factors are prior diagnosis of anxiety.  Previous treatment includes current Rx.  He complains of the following medication side effects: none.       Patient saw rheumatology and had negative workup. He has also followed up with ophthalmology and been started on steroid eye drops and artificial tears which has helped.  He reports symptoms have improved overall.   The following portions of the patient's history were reviewed and updated as appropriate: allergies, current medications, past family history, past medical history, past social history, past surgical history and problem list.    Review of Systems   Constitutional: Negative for fatigue.   Eyes: Negative for discharge.   Respiratory: Negative for cough and shortness of breath.    Cardiovascular: Negative for chest pain and palpitations.   Skin: Negative for rash.   Psychiatric/Behavioral: Negative for dysphoric mood and sleep disturbance. The patient is not nervous/anxious.        Objective   Physical Exam   Constitutional: He is oriented to person, place, and time. He appears well-developed and well-nourished.   Pulmonary/Chest: Effort normal.   Neurological: He is oriented to person, place, and time.   Skin: Skin is warm and dry.   Psychiatric: He has a normal mood and affect. His behavior is normal. Judgment and thought content normal.   Nursing note and vitals reviewed.      Assessment/Plan   Problems Addressed this Visit        Other    Anxiety    Relevant Medications    escitalopram (LEXAPRO) 10 MG tablet

## 2019-11-04 DIAGNOSIS — F41.9 ANXIETY: ICD-10-CM

## 2019-11-04 RX ORDER — ESCITALOPRAM OXALATE 10 MG/1
10 TABLET ORAL DAILY
Qty: 30 TABLET | Refills: 0 | Status: SHIPPED | OUTPATIENT
Start: 2019-11-04 | End: 2022-10-19

## 2021-04-16 ENCOUNTER — BULK ORDERING (OUTPATIENT)
Dept: CASE MANAGEMENT | Facility: OTHER | Age: 36
End: 2021-04-16

## 2021-04-16 DIAGNOSIS — Z23 IMMUNIZATION DUE: ICD-10-CM

## 2022-10-19 ENCOUNTER — OFFICE VISIT (OUTPATIENT)
Dept: FAMILY MEDICINE CLINIC | Facility: CLINIC | Age: 37
End: 2022-10-19

## 2022-10-19 VITALS
HEART RATE: 85 BPM | SYSTOLIC BLOOD PRESSURE: 132 MMHG | WEIGHT: 210 LBS | DIASTOLIC BLOOD PRESSURE: 84 MMHG | RESPIRATION RATE: 18 BRPM | BODY MASS INDEX: 28.44 KG/M2 | HEIGHT: 72 IN | OXYGEN SATURATION: 98 % | TEMPERATURE: 97.1 F

## 2022-10-19 DIAGNOSIS — N20.0 RIGHT KIDNEY STONE: ICD-10-CM

## 2022-10-19 DIAGNOSIS — K62.5 RECTAL BLEEDING: Primary | ICD-10-CM

## 2022-10-19 PROBLEM — K21.9 GASTROESOPHAGEAL REFLUX DISEASE WITHOUT ESOPHAGITIS: Status: RESOLVED | Noted: 2018-07-12 | Resolved: 2022-10-19

## 2022-10-19 PROCEDURE — 99204 OFFICE O/P NEW MOD 45 MIN: CPT | Performed by: FAMILY MEDICINE

## 2022-10-19 NOTE — PROGRESS NOTES
"Chief Complaint  Hospital Follow Up Visit (ER on 10/6/22)    Abdoulaye Hall presents to [facility]  To follow-up on recent emergency room visit.  Patient developed some abdomen pain and some bright red rectal bleeding about 1 to 2 weeks ago.  It lasted a couple of days before cleared up.  Incidental work-up in the emergency room also uncovered a right kidney stone that was nonobstructive.  There is family history of nephrolithiasis.  Patient is here in follow-up and was told to consider getting gastroenterology visit.  Incidentally, he did have some bleeding episode about 2 years ago and none since.  He has seen the gastroenterologist and would like to be referred back there.        Objective   Vital Signs:   Vitals:    10/19/22 1600   BP: 132/84   Pulse: 85   Resp: 18   Temp: 97.1 °F (36.2 °C)   SpO2: 98%   Weight: 95.3 kg (210 lb)   Height: 182.9 cm (72\")                Physical Exam  Constitutional:       General: He is not in acute distress.  Genitourinary:     Rectum: No anal fissure or external hemorrhoid.   Neurological:      Mental Status: He is alert.          Result Review :     The following data was reviewed by: Loi Saenz MD on 10/19/2022:  CT Abdomen & Pelvis W IV Contrast (10/06/2022 09:41)  Urinalysis With Microscopic - (10/06/2022 11:01)  CBC AND DIFFERENTIAL (10/06/2022 08:49)  LIPASE (10/06/2022 08:49)  COMPREHENSIVE METABOLIC PANEL (10/06/2022 08:49)           Assessment and Plan    Diagnoses and all orders for this visit:    1. Rectal bleeding (Primary)  Assessment & Plan:  Recurrent history. Referral to GI    Orders:  -     Ambulatory Referral to Gastroenterology    2. Right kidney stone  Assessment & Plan:  Kidney stone finding is incidental to recent work-up.  Family history positive for kidney stones        Follow Up   Return if symptoms worsen or fail to improve.  Patient was given instructions and counseling regarding his condition or for health " maintenance advice. Please see specific information pulled into the AVS if appropriate.

## 2022-10-21 ENCOUNTER — TELEPHONE (OUTPATIENT)
Dept: GASTROENTEROLOGY | Facility: CLINIC | Age: 37
End: 2022-10-21

## 2022-10-21 NOTE — TELEPHONE ENCOUNTER
Hub staff attempted to follow warm transfer process and was unsuccessful     Caller: Isabel Parrish    Relationship to patient: Self    Best call back number: [phone]    Patient is needing: PT RETURNING CALL TO SCHEDULE, NO ANSWER, PLEASE CALL PT BACK

## 2022-11-08 ENCOUNTER — TELEPHONE (OUTPATIENT)
Dept: GASTROENTEROLOGY | Facility: CLINIC | Age: 37
End: 2022-11-08

## 2022-11-08 ENCOUNTER — OFFICE VISIT (OUTPATIENT)
Dept: GASTROENTEROLOGY | Facility: CLINIC | Age: 37
End: 2022-11-08

## 2022-11-08 VITALS
TEMPERATURE: 96.2 F | WEIGHT: 209.8 LBS | OXYGEN SATURATION: 97 % | BODY MASS INDEX: 28.42 KG/M2 | HEIGHT: 72 IN | SYSTOLIC BLOOD PRESSURE: 146 MMHG | DIASTOLIC BLOOD PRESSURE: 60 MMHG | HEART RATE: 79 BPM

## 2022-11-08 DIAGNOSIS — K62.5 RECTAL BLEEDING: Primary | ICD-10-CM

## 2022-11-08 PROCEDURE — 99203 OFFICE O/P NEW LOW 30 MIN: CPT | Performed by: INTERNAL MEDICINE

## 2022-11-08 NOTE — PROGRESS NOTES
Chief Complaint   Patient presents with   • Rectal Bleeding   • Abdominal Pain     Isabel Parrish is a 36 y.o. male who presents with a history of recurrent rectal bleeding  HPI     Patient 36-year-old male with history of factor V Leiden deficiency and reflux presented to the hospital with lower abdominal pain and recurrent rectal bleeding.  Patient seen several years ago for the same but had been fine since then.  Patient does report family history significant for father with colon polyps at 50 and father's brother with colon cancer at first screening at 50.  Patient denies weight loss no nausea vomiting no melena.  Patient's pain last month at presentation has improved and is no further bleeding.    Past Medical History:   Diagnosis Date   • Allergic    • Cerumen impaction    • Clotting disorder (HCC) 4/24/18    Not a bleeding problem per say, Dioagnosed Factor 5 Leiden   • Gastroesophageal reflux disease without esophagitis 7/12/2018       Current Outpatient Medications:   •  montelukast (SINGULAIR) 10 MG tablet, Take 10 mg by mouth Daily., Disp: , Rfl: 6  •  Multiple Vitamins-Minerals (VISION VITAMINS PO), Take  by mouth., Disp: , Rfl:   No Known Allergies  Social History     Socioeconomic History   • Marital status: Single   • Number of children: 0   Tobacco Use   • Smoking status: Never   • Smokeless tobacco: Never   Substance and Sexual Activity   • Alcohol use: Yes     Alcohol/week: 2.0 standard drinks     Types: 1 Cans of beer, 1 Drinks containing 0.5 oz of alcohol per week     Comment: More of 0-1 a week honestly. Don't drink much.   • Drug use: No   • Sexual activity: Never     Partners: Female     Family History   Problem Relation Age of Onset   • Hypertension Mother    • Factor V Leiden deficiency Mother    • Pancreatitis Mother    • Nephrolithiasis Father    • Colon polyps Father         Non malignant   • Anxiety disorder Father    • Hypertension Father         Kidney Stones   • Factor V Leiden  deficiency Sister    • Anxiety disorder Sister    • Hypertension Sister         Gaulbladder stones   • Nephrolithiasis Brother    • Hypertension Brother         Kidney Stones   • Hypertension Brother         Gaulbladder stoned   • Liver cancer Maternal Grandmother    • Colon cancer Paternal Cousin      Review of Systems   Constitutional: Negative.    HENT: Negative.    Eyes: Negative.    Respiratory: Negative.    Cardiovascular: Negative.    Gastrointestinal: Negative.    Endocrine: Negative.    Musculoskeletal: Negative.    Skin: Negative.    Allergic/Immunologic: Negative.    Hematological: Negative.      Vitals:    11/08/22 1116   BP: 146/60   Pulse: 79   Temp: 96.2 °F (35.7 °C)   SpO2: 97%     Physical Exam  Vitals and nursing note reviewed.   Constitutional:       Appearance: Normal appearance. He is well-developed and normal weight.   HENT:      Head: Normocephalic and atraumatic.   Eyes:      General: No scleral icterus.     Conjunctiva/sclera: Conjunctivae normal.   Neck:      Trachea: No tracheal deviation.   Cardiovascular:      Rate and Rhythm: Normal rate and regular rhythm.      Heart sounds: No murmur heard.    No friction rub. No gallop.   Pulmonary:      Effort: Pulmonary effort is normal. No respiratory distress.      Breath sounds: Normal breath sounds. No wheezing or rales.   Abdominal:      General: Bowel sounds are normal. There is no distension.      Palpations: Abdomen is soft. There is no mass.      Tenderness: There is no abdominal tenderness. There is no guarding or rebound.   Musculoskeletal:         General: No swelling or tenderness. Normal range of motion.      Cervical back: Normal range of motion and neck supple. No rigidity.   Skin:     General: Skin is warm and dry.      Coloration: Skin is not jaundiced.      Findings: No rash.   Neurological:      General: No focal deficit present.      Mental Status: He is alert and oriented to person, place, and time.      Cranial Nerves: No  cranial nerve deficit.   Psychiatric:         Behavior: Behavior normal.         Thought Content: Thought content normal.         Judgment: Judgment normal.       Diagnoses and all orders for this visit:    Rectal bleeding    Patient 36-year-old male with history of factor V Leiden deficiency with recurrent rectal bleeding and abdominal pain here for evaluation.  Patient seen in the past for similar episode now with recurrence.  Labs were otherwise normal CT was normal.  At this point with recurrent bleeding would recommend proceeding with colonoscopy to confirm no other issues.  Patient does report father with colon polyps at 50 and father's brother with colon cancer at first screening at 50.  We will follow-up clinically based on endoscopic findings

## 2022-11-08 NOTE — TELEPHONE ENCOUNTER
SLAVA patient via telephone for. Scheduled 11/30/2022 with arrival time of 1100a. Prep paperwork mailed to verified address on file. Patient advised arrival time may change based on [facility] guidelines. SLAVA AGUERO

## 2022-11-28 ENCOUNTER — TELEPHONE (OUTPATIENT)
Dept: GASTROENTEROLOGY | Facility: CLINIC | Age: 37
End: 2022-11-28

## 2024-06-11 ENCOUNTER — TELEPHONE (OUTPATIENT)
Dept: FAMILY MEDICINE CLINIC | Facility: CLINIC | Age: 39
End: 2024-06-11
Payer: COMMERCIAL

## 2024-06-11 NOTE — TELEPHONE ENCOUNTER
Called PT at [phone], no answer, left VM advising PT that I received his appt request regarding hypertension/BP check, scheduled for first available with provider Dr. Saenz 6/17/24 at 4:30 pm, advised PT if date/time does not work for him to call us back.    HUB TO RELAY

## 2024-06-20 ENCOUNTER — OFFICE VISIT (OUTPATIENT)
Dept: FAMILY MEDICINE CLINIC | Facility: CLINIC | Age: 39
End: 2024-06-20
Payer: COMMERCIAL

## 2024-06-20 VITALS
WEIGHT: 211 LBS | BODY MASS INDEX: 28.58 KG/M2 | HEART RATE: 70 BPM | OXYGEN SATURATION: 98 % | SYSTOLIC BLOOD PRESSURE: 148 MMHG | HEIGHT: 72 IN | DIASTOLIC BLOOD PRESSURE: 86 MMHG

## 2024-06-20 DIAGNOSIS — Z13.6 SCREENING FOR ISCHEMIC HEART DISEASE: ICD-10-CM

## 2024-06-20 DIAGNOSIS — Z23 NEED FOR VACCINATION: ICD-10-CM

## 2024-06-20 DIAGNOSIS — R03.0 ELEVATED BP WITHOUT DIAGNOSIS OF HYPERTENSION: Primary | ICD-10-CM

## 2024-06-20 DIAGNOSIS — Z11.59 NEED FOR HEPATITIS C SCREENING TEST: ICD-10-CM

## 2024-06-20 PROBLEM — K62.5 RECTAL BLEEDING: Status: RESOLVED | Noted: 2022-10-06 | Resolved: 2024-06-20

## 2024-06-20 PROBLEM — J30.1 NON-SEASONAL ALLERGIC RHINITIS DUE TO POLLEN: Status: ACTIVE | Noted: 2024-06-20

## 2024-06-20 PROBLEM — R19.5 POSITIVE FECAL OCCULT BLOOD TEST: Status: RESOLVED | Noted: 2018-07-12 | Resolved: 2024-06-20

## 2024-06-20 PROCEDURE — 90715 TDAP VACCINE 7 YRS/> IM: CPT | Performed by: FAMILY MEDICINE

## 2024-06-20 PROCEDURE — 99214 OFFICE O/P EST MOD 30 MIN: CPT | Performed by: FAMILY MEDICINE

## 2024-06-20 PROCEDURE — 90471 IMMUNIZATION ADMIN: CPT | Performed by: FAMILY MEDICINE

## 2024-06-20 RX ORDER — LISINOPRIL 5 MG/1
5 TABLET ORAL NIGHTLY
Qty: 90 TABLET | Refills: 1 | Status: SHIPPED | OUTPATIENT
Start: 2024-06-20 | End: 2024-12-17

## 2024-06-20 RX ORDER — LORATADINE 10 MG/1
1 CAPSULE, LIQUID FILLED ORAL DAILY
COMMUNITY

## 2024-06-20 NOTE — PATIENT INSTRUCTIONS
"DASH Eating Plan  DASH stands for Dietary Approaches to Stop Hypertension. The DASH eating plan is a healthy eating plan that has been shown to:  Lower high blood pressure (hypertension).  Reduce your risk for type 2 diabetes, heart disease, and stroke.  Help with weight loss.  What are tips for following this plan?  Reading food labels  Check food labels for the amount of salt (sodium) per serving. Choose foods with less than 5 percent of the Daily Value (DV) of sodium. In general, foods with less than 300 milligrams (mg) of sodium per serving fit into this eating plan.  To find whole grains, look for the word \"whole\" as the first word in the ingredient list.  Shopping  Buy products labeled as \"low-sodium\" or \"no salt added.\"  Buy fresh foods. Avoid canned foods and pre-made or frozen meals.  Cooking  Try not to add salt when you cook. Use salt-free seasonings or herbs instead of table salt or sea salt. Check with your health care provider or pharmacist before using salt substitutes.  Do not sanchez foods. Cook foods in healthy ways, such as baking, boiling, grilling, roasting, or broiling.  Cook using oils that are good for your heart. These include olive, canola, avocado, soybean, and sunflower oil.  Meal planning    Eat a balanced diet. This should include:  4 or more servings of fruits and 4 or more servings of vegetables each day. Try to fill half of your plate with fruits and vegetables.  6-8 servings of whole grains each day.  6 or less servings of lean meat, poultry, or fish each day. 1 oz is 1 serving. A 3 oz (85 g) serving of meat is about the same size as the palm of your hand. One egg is 1 oz (28 g).  2-3 servings of low-fat dairy each day. One serving is 1 cup (237 mL).  1 serving of nuts, seeds, or beans 5 times each week.  2-3 servings of heart-healthy fats. Healthy fats called omega-3 fatty acids are found in foods such as walnuts, flaxseeds, fortified milks, and eggs. These fats are also found in " cold-water fish, such as sardines, salmon, and mackerel.  Limit how much you eat of:  Canned or prepackaged foods.  Food that is high in trans fat, such as fried foods.  Food that is high in saturated fat, such as fatty meat.  Desserts and other sweets, sugary drinks, and other foods with added sugar.  Full-fat dairy products.  Do not salt foods before eating.  Do not eat more than 4 egg yolks a week.  Try to eat at least 2 vegetarian meals a week.  Eat more home-cooked food and less restaurant, buffet, and fast food.  Lifestyle  When eating at a restaurant, ask if your food can be made with less salt or no salt.  If you drink alcohol:  Limit how much you have to:  0-1 drink a day if you are female.  0-2 drinks a day if you are male.  Know how much alcohol is in your drink. In the [place] one drink is one 12 oz bottle of beer (355 mL), one 5 oz glass of wine (148 mL), or one 1½ oz glass of hard liquor (44 mL).  General information  Avoid eating more than 2,300 mg of salt a day. If you have hypertension, you may need to reduce your sodium intake to 1,500 mg a day.  Work with your provider to stay at a healthy body weight or lose weight. Ask what the best weight range is for you.  On most days of the week, get at least 30 minutes of exercise that causes your heart to beat faster. This may include walking, swimming, or biking.  Work with your provider or dietitian to adjust your eating plan to meet your specific calorie needs.  What foods should I eat?  Fruits  All fresh, dried, or frozen fruit. Canned fruits that are in their natural juice and do not have sugar added to them.  Vegetables  Fresh or frozen vegetables that are raw, steamed, roasted, or grilled. Low-sodium or reduced-sodium tomato and vegetable juice. Low-sodium or reduced-sodium tomato sauce and tomato paste. Low-sodium or reduced-sodium canned vegetables.  Grains  Whole-grain or whole-wheat bread. Whole-grain or whole-wheat pasta. Brown rice. Oatmeal.  Quinoa. Bulgur. Whole-grain and low-sodium cereals. Jany bread. Low-fat, low-sodium crackers. Whole-wheat flour tortillas.  Meats and other proteins  Skinless chicken or turkey. Ground chicken or turkey. Pork with fat trimmed off. Fish and seafood. Egg whites. Dried beans, peas, or lentils. Unsalted nuts, nut butters, and seeds. Unsalted canned beans. Lean cuts of beef with fat trimmed off. Low-sodium, lean precooked or cured meat, such as sausages or meat loaves.  Dairy  Low-fat (1%) or fat-free (skim) milk. Reduced-fat, low-fat, or fat-free cheeses. Nonfat, low-sodium ricotta or cottage cheese. Low-fat or nonfat yogurt. Low-fat, low-sodium cheese.  Fats and oils  Soft margarine without trans fats. Vegetable oil. Reduced-fat, low-fat, or light mayonnaise and salad dressings (reduced-sodium). Canola, safflower, olive, avocado, soybean, and sunflower oils. Avocado.  Seasonings and condiments  Herbs. Spices. Seasoning mixes without salt.  Other foods  Unsalted popcorn and pretzels. Fat-free sweets.  The items listed above may not be all the foods and drinks you can have. Talk to a dietitian to learn more.  What foods should I avoid?  Fruits  Canned fruit in a light or heavy syrup. Fried fruit. Fruit in cream or butter sauce.  Vegetables  Creamed or fried vegetables. Vegetables in a cheese sauce. Regular canned vegetables that are not marked as low-sodium or reduced-sodium. Regular canned tomato sauce and paste that are not marked as low-sodium or reduced-sodium. Regular tomato and vegetable juices that are not marked as low-sodium or reduced-sodium. Pickles. Olives.  Grains  Baked goods made with fat, such as croissants, muffins, or some breads. Dry pasta or rice meal packs.  Meats and other proteins  Fatty cuts of meat. Ribs. Fried meat. Flores. Bologna, salami, and other precooked or cured meats, such as sausages or meat loaves, that are not lean and low in sodium. Fat from the back of a pig (fatback). Donald.  Salted nuts and seeds. Canned beans with added salt. Canned or smoked fish. Whole eggs or egg yolks. Chicken or turkey with skin.  Dairy  Whole or 2% milk, cream, and half-and-half. Whole or full-fat cream cheese. Whole-fat or sweetened yogurt. Full-fat cheese. Nondairy creamers. Whipped toppings. Processed cheese and cheese spreads.  Fats and oils  Butter. Stick margarine. Lard. Shortening. Ghee. Flores fat. Tropical oils, such as coconut, palm kernel, or palm oil.  Seasonings and condiments  Onion salt, garlic salt, seasoned salt, table salt, and sea salt. Worcestershire sauce. Tartar sauce. Barbecue sauce. Teriyaki sauce. Soy sauce, including reduced-sodium soy sauce. Steak sauce. Canned and packaged gravies. Fish sauce. Oyster sauce. Cocktail sauce. Store-bought horseradish. Ketchup. Mustard. Meat flavorings and tenderizers. Bouillon cubes. Hot sauces. Pre-made or packaged marinades. Pre-made or packaged taco seasonings. Relishes. Regular salad dressings.  Other foods  Salted popcorn and pretzels.  The items listed above may not be all the foods and drinks you should avoid. Talk to a dietitian to learn more.  Where to find more information  National Heart, Lung, and Blood [place] (NHLBI): nhlbi.nih.gov  American Heart Association (AHA): heart.org  Academy of Nutrition and Dietetics: eatright.org  National Kidney Foundation (NKF): kidney.org  This information is not intended to replace advice given to you by your health care provider. Make sure you discuss any questions you have with your health care provider.  Document Revised: 01/04/2024 Document Reviewed: 01/04/2024  Elsevier Patient Education © 2024 Elsevier Inc.

## 2024-06-20 NOTE — PROGRESS NOTES
Chief Complaint  Blood Pressure Check (At dentist appts BP has been elevated)    Subjective        Hypertension  This is a new problem. The current episode started more than 1 month ago. The problem is unchanged. Pertinent negatives include no anxiety, blurred vision, chest pain, headaches, malaise/fatigue, orthopnea, palpitations, peripheral edema or shortness of breath. There are no associated agents to hypertension. Compliance problems include diet.       History of Present Illness  The patient is a 38-year-old male who presents for evaluation of multiple medical concerns.    The patient has discontinued the use of montelukast due to a lapse in obtaining a prescription without consultation with his allergist. He intends to schedule an appointment. His current medication regimen includes a men's multivitamin and loratadine.    The patient's blood pressure has been consistently elevated during recent dental visits. Despite maintaining a high level of physical activity at work and adhering to a workout regimen for the past year, he acknowledges a suboptimal diet. He does not possess a home blood pressure monitor.     The patient reports a palpable lump in his sternum, which he attributes to increased physical activity. He denies experiencing any chest pain. He recalls a shoulder injury, followed by numbness in the left hand subsequent to his last visit. He also has a history of nerve issues. He sought chiropractic treatment for his shoulder, during which his shoulder was adjusted.  The patient denies smoking or drug use. He is a social drinker and drinks once a week.   He has a family history of hypertension.     Review of Systems   Constitutional:  Negative for malaise/fatigue.   Eyes:  Negative for blurred vision.   Respiratory:  Negative for shortness of breath.    Cardiovascular:  Negative for chest pain, palpitations and orthopnea.        Vital Signs:   Vitals:    06/20/24 1528   BP: 148/86   Pulse: 70   SpO2:  "98%   Weight: 95.7 kg (211 lb)   Height: 182.9 cm (72\")            6/20/2024     3:34 PM   PHQ-2/PHQ-9 Depression Screening   Little Interest or Pleasure in Doing Things 0-->not at all   Feeling Down, Depressed or Hopeless 0-->not at all   PHQ-9: Brief Depression Severity Measure Score 0       BMI is >= 25 and <30. (Overweight) The following options were offered after discussion;: weight loss educational material (shared in after visit summary), exercise counseling/recommendations, and nutrition counseling/recommendations        Physical Exam  Vitals reviewed.   Constitutional:       General: He is not in acute distress.     Appearance: He is overweight.   Eyes:      General: Lids are normal.      Conjunctiva/sclera: Conjunctivae normal.   Neck:      Vascular: No carotid bruit.      Trachea: No tracheal deviation.   Cardiovascular:      Rate and Rhythm: Normal rate and regular rhythm.      Heart sounds: Normal heart sounds. No murmur heard.  Pulmonary:      Effort: Pulmonary effort is normal.      Breath sounds: Normal breath sounds.   Skin:     General: Skin is warm and dry.   Neurological:      Mental Status: He is alert. He is not disoriented.   Psychiatric:         Speech: Speech normal.         Behavior: Behavior normal. Behavior is cooperative.       Physical Exam  There is a 1 cm lipoma between the 5th and 6th rib in the musculoskeletal system. It is mobile and smooth-walled.               Results                  Assessment and Plan     Orders Placed This Encounter   Procedures    Tdap Vaccine Greater Than or Equal To 8yo IM    Comprehensive Metabolic Panel    Lipid Panel With / Chol / HDL Ratio    Hepatitis C Antibody    CBC & Differential     New Medications Ordered This Visit   Medications    lisinopril (PRINIVIL,ZESTRIL) 5 MG tablet     Sig: Take 1 tablet by mouth Every Night for 180 days.     Dispense:  90 tablet     Refill:  1     Assessment & Plan  1. Hypertension.  The patient presents with elevated " blood pressure. The treatment plan includes the administration of low-dose lisinopril, 5 mg at bedtime. A follow-up appointment is scheduled for 2 months from now. It is recommended that the patient acquire an Omron blood pressure meter, monitor blood pressure once or twice daily, and maintain a blood pressure log for the next few months prior to our follow-up visit. Screening labs will be conducted prior to the follow-up visit in 2 months. The patient has been advised to adhere to a DASH diet and continue with aerobic activity for 30 minutes, 5 days a week.     Today, the patient is also due for Adacel and Tdap, which has been administered. Information on the new blood pressure medication has been shared in the after-visit as well.         Patient was given instructions and counseling regarding his condition or for health maintenance advice. Please see specific information pulled into the AVS if appropriate.     Patient or patient representative verbalized consent for the use of Ambient Listening during the visit with  Loi Saenz MD for chart documentation. 6/20/2024  16:00 EDT

## 2024-08-19 ENCOUNTER — OFFICE VISIT (OUTPATIENT)
Dept: FAMILY MEDICINE CLINIC | Facility: CLINIC | Age: 39
End: 2024-08-19
Payer: COMMERCIAL

## 2024-08-19 VITALS
DIASTOLIC BLOOD PRESSURE: 58 MMHG | WEIGHT: 210 LBS | SYSTOLIC BLOOD PRESSURE: 102 MMHG | OXYGEN SATURATION: 99 % | BODY MASS INDEX: 28.44 KG/M2 | HEIGHT: 72 IN | HEART RATE: 76 BPM

## 2024-08-19 DIAGNOSIS — I10 PRIMARY HYPERTENSION: Primary | ICD-10-CM

## 2024-08-19 DIAGNOSIS — Z13.6 SCREENING FOR ISCHEMIC HEART DISEASE: ICD-10-CM

## 2024-08-19 PROCEDURE — 99213 OFFICE O/P EST LOW 20 MIN: CPT | Performed by: FAMILY MEDICINE

## 2024-08-19 RX ORDER — LISINOPRIL 5 MG/1
5 TABLET ORAL NIGHTLY
Qty: 90 TABLET | Refills: 4 | Status: SHIPPED | OUTPATIENT
Start: 2024-08-19 | End: 2025-11-12

## 2024-08-19 NOTE — PROGRESS NOTES
"Chief Complaint  Follow-up (2 month) and Hypertension    Subjective        HPI   Rishil presents to [facility] for lab review and to follow-up on recent medications.  Blood pressure well-controlled.  No side effects reported.          Objective   Vital Signs:   Vitals:    08/19/24 1425   BP: 102/58   Pulse: 76   SpO2: 99%   Weight: 95.3 kg (210 lb)   Height: 182.9 cm (72\")            6/20/2024     3:34 PM   PHQ-2/PHQ-9 Depression Screening   Little Interest or Pleasure in Doing Things 0-->not at all   Feeling Down, Depressed or Hopeless 0-->not at all   PHQ-9: Brief Depression Severity Measure Score 0                 Physical Exam     Result Review :     The following data was reviewed by: Loi Saenz MD on 08/19/2024:  Hepatitis C Antibody (08/05/2024 15:09) negative  Lipid Panel With / Chol / HDL Ratio (08/05/2024 15:09) mild elevation triglycerides noted  CBC & Differential (08/05/2024 15:09)  Comprehensive Metabolic Panel (08/05/2024 15:09) (labs were nonfasting)         Assessment and Plan    Assessment & Plan  Primary hypertension   blood pressure controlled with lisinopril.  Continue same therapy.  Follow-up in the office visit with annual wellness visit in 1 year.  Screening for ischemic heart disease      Orders Placed This Encounter   Procedures    Comprehensive Metabolic Panel    CK    Lipid Panel With / Chol / HDL Ratio    TSH    CBC & Differential     New Medications Ordered This Visit   Medications    lisinopril (PRINIVIL,ZESTRIL) 5 MG tablet     Sig: Take 1 tablet by mouth Every Night.     Dispense:  90 tablet     Refill:  4          Follow Up   Return in about 1 year (around 8/19/2025) for Adult wellness & medication appt, schedule 30 min.  Patient was given instructions and counseling regarding his condition or for health maintenance advice. Please see specific information pulled into the AVS if appropriate.   "

## 2024-08-19 NOTE — ASSESSMENT & PLAN NOTE
blood pressure controlled with lisinopril.  Continue same therapy.  Follow-up in the office visit with annual wellness visit in 1 year.

## 2024-10-15 NOTE — DISCHARGE INSTRUCTIONS
Use over the counter miralax powder once a day for two weeks.  Use Preperation H as needed and take a sitz bath once a day for a week.  Please return to the ED if symptoms worsen with fever or increasing pain.   
15-Oct-2024